# Patient Record
Sex: FEMALE | Race: WHITE | URBAN - METROPOLITAN AREA
[De-identification: names, ages, dates, MRNs, and addresses within clinical notes are randomized per-mention and may not be internally consistent; named-entity substitution may affect disease eponyms.]

---

## 2017-03-22 ENCOUNTER — THERAPY VISIT (OUTPATIENT)
Dept: PHYSICAL THERAPY | Facility: CLINIC | Age: 67
End: 2017-03-22
Payer: COMMERCIAL

## 2017-03-22 DIAGNOSIS — M79.671 RIGHT FOOT PAIN: ICD-10-CM

## 2017-03-22 DIAGNOSIS — Z47.89 AFTERCARE FOLLOWING SURGERY OF THE MUSCULOSKELETAL SYSTEM: ICD-10-CM

## 2017-03-22 PROCEDURE — 97164 PT RE-EVAL EST PLAN CARE: CPT | Mod: GP | Performed by: PHYSICAL THERAPIST

## 2017-03-22 PROCEDURE — 97110 THERAPEUTIC EXERCISES: CPT | Mod: GP | Performed by: PHYSICAL THERAPIST

## 2017-03-22 PROCEDURE — 97140 MANUAL THERAPY 1/> REGIONS: CPT | Mod: GP | Performed by: PHYSICAL THERAPIST

## 2017-03-22 NOTE — MR AVS SNAPSHOT
After Visit Summary   3/22/2017    Jyoti Alvarez    MRN: [id number]           Patient Information     Date Of Birth          1950        Visit Information        Provider Department      3/22/2017 2:40 PM Aby George, PT [facility]        Today's Diagnoses     Right foot pain        Aftercare following surgery of the musculoskeletal system           Follow-ups after your visit        Your next 10 appointments already scheduled     Mar 31, 2017  2:10 PM CDT   LEONARDO Extremity with Aby George PT   [facility] ([facility])    [address]  [facility] [id number] [phone]            Apr 05, 2017  2:00 PM CDT   [facility] Extremity with Aby George PT   [facility] ([facility])    [address]  [facility] [id number] [phone]              Who to contact     If you have questions or need follow up information about today's clinic visit or your schedule please contact [facility] directly at [phone].  Normal or non-critical lab and imaging results will be communicated to you by MyChart, letter or phone within 4 business days after the clinic has received the results. If you do not hear from us within 7 days, please contact the clinic through [employer] or phone. If you have a critical or abnormal lab result, we will notify you by phone as soon as possible.  Submit refill requests through [employer] or call your pharmacy and they will forward the refill request to us. Please allow 3 business days for your refill to be completed.          Additional Information About Your Visit        MyChart Information     [employer] lets you send messages to your doctor, view your test results, renew your prescriptions, schedule appointments and more. To sign up, go to  [email or web address] . Click on \"Log in\" on the left side of the screen, which will take you to the Welcome page. Then click on \"Sign up Now\" on the right side of the page.     You will be asked to enter the access code listed below, as well as some personal information. Please follow the directions to create your username and password.     Your access code is: [email or web address]  Expires: 2017  3:34 PM     Your access code will  in 90 days. If you need help or a new code, please call your [place] clinic or [phone].        Care EveryWhere ID     This is your Care EveryWhere ID. This could be used by other organizations to access your [place] medical records  [id number]         Blood Pressure from Last 3 Encounters:   No data found for BP    Weight from Last 3 Encounters:   No data found for Wt              We Performed the Following     HC PT RE-EVAL     LEONARDO PROGRESS NOTES REPORT     MANUAL THER TECH,1+REGIONS,EA 15 MIN     THERAPEUTIC EXERCISES        Primary Care Provider    None Specified       No primary provider on file.        Thank you!     Thank you for choosing [facility]  for your care. Our goal is always to provide you with excellent care. Hearing back from our patients is one way we can continue to improve our services. Please take a few minutes to complete the written survey that you may receive in the mail after your visit with us. Thank you!             Your Updated Medication List - Protect others around you: Learn how to safely use, store and throw away your medicines at www.disposemymeds.org.      Notice  As of 3/22/2017  3:34 PM    You have not been prescribed any medications.      "

## 2017-03-22 NOTE — PROGRESS NOTES
Subjective:    HPI                    Objective:    System    Physical Exam    General     ROS    Assessment/Plan:      PROGRESS  REPORT    Progress reporting period is from 12/28/16 to 3/22/17.       SUBJECTIVE  Subjective: Mary has returned from spending winter months in [place], she reports that she continues to have mid-foot pain with walking, and with active motion (not even WB).  Occasional swelling.  Feels that stiffness in big toe leading to midfoot pain.  L foot surgery revision for hammer toe on Monday 3/27, will limit WB activities for a couple of weeks.      Current Pain level: 3/10.     Initial Pain level: 2/10.   Changes in function:  Yes (See Goal flowsheet attached for changes in current functional level)  Adverse reaction to treatment or activity: activity - increased pain with walking    OBJECTIVE  Objective: R ankle AROM: DF 12, PF 53, Inv 12, Ev 6. Great toe flexion 12, great toe ext 30.  Ankle strength 5/5 in all directions, great toe 5/5 flexion, 4-/5 great toe ext.  No edema noted today.  Significant decrease in 1st MTP mobility into both flexion and extension.  Gait without significant deviation.  Addition of repeated great toe flexion/extension with instructions for increased frequency, every 2-3 hours, 2x10 reps each direction.       ASSESSMENT/PLAN  Updated problem list and treatment plan: Diagnosis 1:  R foot pain, R great toe pain, s/p R midfoot fusion, STJ fusion, posterior tibial tendon repair with midfoot fusion, cheilectomy, 2nd and 3rd metatarsal osteotomies    Pain -  hot/cold therapy, manual therapy, self management, education, directional preference exercise and home program  Decreased ROM/flexibility - manual therapy, therapeutic exercise and home program  Decreased joint mobility - manual therapy, therapeutic exercise and home program  Decreased strength - therapeutic exercise, therapeutic activities and home program  Impaired muscle performance - neuro re-education and home  program  Decreased function - therapeutic activities and home program  STG/LTGs have been met or progress has been made towards goals:  Yes (See Goal flow sheet completed today.)  Assessment of Progress: The patient's progress has plateaued.  Self Management Plans:  Patient has been instructed in a home treatment program.  Patient  has been instructed in self management of symptoms.  I have re-evaluated this patient and find that the nature, scope, duration and intensity of the therapy is appropriate for the medical condition of the patient.  Jyoti continues to require the following intervention to meet STG and LTG's:  PT    Recommendations:  This patient would benefit from continued therapy.     Frequency:  1-2 X week, once daily  Duration:  for up to 8 visits        Please refer to the daily flowsheet for treatment today, total treatment time and time spent performing 1:1 timed codes.

## 2017-04-05 ENCOUNTER — THERAPY VISIT (OUTPATIENT)
Dept: PHYSICAL THERAPY | Facility: CLINIC | Age: 67
End: 2017-04-05
Payer: COMMERCIAL

## 2017-04-05 DIAGNOSIS — M79.671 RIGHT FOOT PAIN: ICD-10-CM

## 2017-04-05 DIAGNOSIS — Z47.89 AFTERCARE FOLLOWING SURGERY OF THE MUSCULOSKELETAL SYSTEM: ICD-10-CM

## 2017-04-05 PROCEDURE — 97110 THERAPEUTIC EXERCISES: CPT | Mod: GP | Performed by: PHYSICAL THERAPIST

## 2017-04-05 PROCEDURE — 97140 MANUAL THERAPY 1/> REGIONS: CPT | Mod: GP | Performed by: PHYSICAL THERAPIST

## 2017-05-10 ENCOUNTER — THERAPY VISIT (OUTPATIENT)
Dept: PHYSICAL THERAPY | Facility: CLINIC | Age: 67
End: 2017-05-10
Payer: COMMERCIAL

## 2017-05-10 DIAGNOSIS — M79.671 RIGHT FOOT PAIN: ICD-10-CM

## 2017-05-10 DIAGNOSIS — Z47.89 AFTERCARE FOLLOWING SURGERY OF THE MUSCULOSKELETAL SYSTEM: ICD-10-CM

## 2017-05-10 PROCEDURE — 97140 MANUAL THERAPY 1/> REGIONS: CPT | Mod: GP | Performed by: PHYSICAL THERAPIST

## 2017-05-10 PROCEDURE — 97110 THERAPEUTIC EXERCISES: CPT | Mod: GP | Performed by: PHYSICAL THERAPIST

## 2017-05-12 ENCOUNTER — THERAPY VISIT (OUTPATIENT)
Dept: PHYSICAL THERAPY | Facility: CLINIC | Age: 67
End: 2017-05-12
Payer: COMMERCIAL

## 2017-05-12 DIAGNOSIS — Z47.89 AFTERCARE FOLLOWING SURGERY OF THE MUSCULOSKELETAL SYSTEM: ICD-10-CM

## 2017-05-12 DIAGNOSIS — M79.672 LEFT FOOT PAIN: Primary | ICD-10-CM

## 2017-05-12 PROCEDURE — 97110 THERAPEUTIC EXERCISES: CPT | Mod: GP | Performed by: PHYSICAL THERAPIST

## 2017-05-12 PROCEDURE — 97161 PT EVAL LOW COMPLEX 20 MIN: CPT | Mod: GP | Performed by: PHYSICAL THERAPIST

## 2017-05-12 PROCEDURE — 97140 MANUAL THERAPY 1/> REGIONS: CPT | Mod: GP | Performed by: PHYSICAL THERAPIST

## 2017-05-12 NOTE — PROGRESS NOTES
[facility] Initial Evaluation    Subjective:    Patient is a 67 year old female presenting with rehab left ankle/foot hpi. The history is provided by the patient. No  was used.   Jyoti Alvarez is a 67 year old female with a left ankle condition.  Condition occurred with:  Other reason.  Condition occurred: for unknown reasons.  This is a new condition  Mary underwent surgery by Dr. Abbott on her L foot on 10/10/17 for midfoot fusion, cheilectomy, neuroma excision from 3rd web space, tailor's bunionectomy, 2 MTPJ capsulotomy, 2nd metatarsal osteotomy, 2nd MPJ capsulotomy and tenotomy.  She underwent a 2nd surgery for hammer toe revision on 3/27/17.  Mary has been referred to PT for evaluation and treatment including ROM and strengthening to L foot.  Chief complaints: Pain in 3rd and 4th toes with walking, limited walking distance/time, unable to participate in previous recreational activities, pilates, walking differently overall.   .    Patient reports pain:  Toes.  Radiates to:  Foot.  Pain is described as stabbing, sharp and burning (feels like a nerve pain) and is intermittent and reported as 4/10.  Associated symptoms:  Loss of strength, loss of motion/stiffness and edema.   Symptoms are exacerbated by activity, walking, ascending stairs, descending stairs, weight bearing and certain positions (scrunching toes) and relieved by rest and ice.  Since onset symptoms are unchanged.  Special tests:  X-ray (well-healed at x-ray at 6 months post op).  Previous treatment includes surgery.    General health as reported by patient is excellent.  Pertinent medical history includes:  Osteoarthritis.  Medical allergies: yes (sulfa, ampicillin).  Other surgeries include:  Orthopedic surgery (R low back, L TKA, R midfoot fusion).  Current medications:  Anti-inflammatory (tramadol prn).  Current occupation is retired.    Primary job tasks include:  Driving and lifting  (pushing/pulling, care for grandchildren, housework).    Barriers include:  None as reported by patient.    Red flags:  None as reported by patient.                        Objective:    Standing Alignment:                Ankle/Foot:  Pes planus R and pes planus L    Gait:    Assistive Devices:  None  Deviations:  Lumbar:  Trunk lean LAnkle:  Normal          Ankle/Foot Evaluation  ROM:    AROM:    Dorsiflexion: Left:   8  Right:    Plantarflexion: Left:  62    Right:   Inversion: Left:  32     Right:   Eversion: 16     Right:   Great toe flexion: Left:  5     Right:   Great Toe Extension: Left:  45     Right:    Strength:    Dorsiflexion:  Left: 5/5   Strong/pain free    Pain:     Plantarflexion: Left: 5/5   Strong/pain free  Pain:     Inversion:Left: 5/5  Strong/pain free  Pain:     Right: /5 strong/pain free Pain:  Eversion:Left: 5/5  Strong/pain free  Pain:    Flexion Great Toe:Left: 5/5  Strong/pain free  Pain:    Extension Great Toe:Left: 5/5  Strong/pain free  Pain:          Extensor Digitorum: Left: 5/5  Strong/pain free  Pain:      LIGAMENT TESTING: not assessed                PALPATION: Palpation of ankle: 3rd webspace tenderness.    EDEMA: Edema ankle: minimal edema on 2nd toe only, not generalized to foot/forefoot.          MOBILITY TESTING:       Talocrural Left: hypomobile        Midtarsal Left: hypomobile      First Ray Left: hypomobile      FUNCTIONAL TESTS: not assessed                                                              General     ROS    Assessment/Plan:      Patient is a 67 year old female with L foot complaints.    Patient has the following significant findings with corresponding treatment plan.                Diagnosis 1:  L foot pain s/p midfoot fusion, cheilectomy, neuroma excision from 3rd web space, tailor's bunionectomy, 2 MTPJ capsulotomy, 2nd metatarsal osteotomy, 2nd MPJ capsulotomy and tenotomy Pain -  hot/cold therapy, manual therapy, self management, education, directional  preference exercise and home program  Decreased ROM/flexibility - manual therapy, therapeutic exercise and home program  Decreased joint mobility - manual therapy, therapeutic exercise and home program  Inflammation - cold therapy and self management/home program  Edema - cold therapy and self management/home program  Decreased function - therapeutic activities and home program    Therapy Evaluation Codes:   1) History comprised of:   Personal factors that impact the plan of care:      None.    Comorbidity factors that impact the plan of care are:      None.     Medications impacting care: None.  2) Examination of Body Systems comprised of:   Body structures and functions that impact the plan of care:      L foot.   Activity limitations that impact the plan of care are:      Stairs, Standing and Walking.  3) Clinical presentation characteristics are:   Stable/Uncomplicated.  4) Decision-Making    Low complexity using standardized patient assessment instrument and/or measureable assessment of functional outcome.  Cumulative Therapy Evaluation is: Low complexity.    Previous and current functional limitations:  (See Goal Flow Sheet for this information)    Short term and Long term goals: (See Goal Flow Sheet for this information)     Communication ability:  Patient appears to be able to clearly communicate and understand verbal and written communication and follow directions correctly.  Treatment Explanation - The following has been discussed with the patient:   RX ordered/plan of care  Anticipated outcomes  Possible risks and side effects  This patient would benefit from PT intervention to resume normal activities.   Rehab potential is good.    Frequency:  1-2 X week, once daily  Duration:  for 8 visits  Discharge Plan:  Achieve all LTG.  Independent in home treatment program.  Reach maximal therapeutic benefit.    Please refer to the daily flowsheet for treatment today, total treatment time and time spent performing  1:1 timed codes.

## 2017-05-12 NOTE — MR AVS SNAPSHOT
After Visit Summary   5/12/2017    Jyoti Alvarez    MRN: [id number]           Patient Information     Date Of Birth          1950        Visit Information        Provider Department      5/12/2017 2:10 PM Aby George, PT [facility]        Today's Diagnoses     Left foot pain    -  1    Aftercare following surgery of the musculoskeletal system           Follow-ups after your visit        Your next 10 appointments already scheduled     May 19, 2017  2:50 PM CDT   LEONARDO Extremity with Aby George PT   [facility] ([facility])    [address]  [facility] [address]   [phone]            May 24, 2017  2:40 PM CDT   LEONARDO Extremity with Aby George PT   [facility] ([facility])    [address]  [facility] [address]   [phone]            May 31, 2017  2:40 PM CDT   LEONARDO Extremity with Aby George, PT   [facility] ([facility])    [address]  [facility] [address]   [phone]              Who to contact     If you have questions or need follow up information about today's clinic visit or your schedule please contact [facility] THER[facility] directly at [phone].  Normal or non-critical lab and imaging results will be communicated to you by MyChart, letter or phone within 4 business days after the clinic has received the results. If you do not hear from us within 7 days, please contact the clinic through MyChart or phone. If you have a critical or abnormal lab result, we will notify you by phone as soon as possible.  Submit refill requests through [employer] or call your pharmacy and they will forward the refill request to us. Please allow 3 business days for your refill to be completed.  "         Additional Information About Your Visit        MyChart Information     byUs lets you send messages to your doctor, view your test results, renew your prescriptions, schedule appointments and more. To sign up, go to [email or web address] . Click on \"Log in\" on the left side of the screen, which will take you to the Welcome page. Then click on \"Sign up Now\" on the right side of the page.     You will be asked to enter the access code listed below, as well as some personal information. Please follow the directions to create your username and password.     Your access code is: [email or web address]  Expires: 2017  3:34 PM     Your access code will  in 90 days. If you need help or a new code, please call your [place] clinic or [phone].        Care EveryWhere ID     This is your Care EveryWhere ID. This could be used by other organizations to access your [place] medical records  [id number]         Blood Pressure from Last 3 Encounters:   No data found for BP    Weight from Last 3 Encounters:   No data found for Wt              We Performed the Following     HC PT EVAL, LOW COMPLEXITY     LEONARDO INITIAL EVAL REPORT     MANUAL THER TECH,1+REGIONS,EA 15 MIN     THERAPEUTIC EXERCISES        Primary Care Provider    None Specified       No primary provider on file.        Thank you!     Thank you for choosing [facility] THERAP[facility]  for your care. Our goal is always to provide you with excellent care. Hearing back from our patients is one way we can continue to improve our services. Please take a few minutes to complete the written survey that you may receive in the mail after your visit with us. Thank you!             Your Updated Medication List - Protect others around you: Learn how to safely use, store and throw away your medicines at www.disposemymeds.org.      Notice  As of 2017  3:46 PM    You have not been prescribed any medications.      "

## 2017-05-19 ENCOUNTER — THERAPY VISIT (OUTPATIENT)
Dept: PHYSICAL THERAPY | Facility: CLINIC | Age: 67
End: 2017-05-19
Payer: COMMERCIAL

## 2017-05-19 DIAGNOSIS — M79.672 LEFT FOOT PAIN: ICD-10-CM

## 2017-05-19 DIAGNOSIS — Z47.89 AFTERCARE FOLLOWING SURGERY OF THE MUSCULOSKELETAL SYSTEM: ICD-10-CM

## 2017-05-19 PROCEDURE — 97140 MANUAL THERAPY 1/> REGIONS: CPT | Mod: GP | Performed by: PHYSICAL THERAPIST

## 2017-05-19 PROCEDURE — 97110 THERAPEUTIC EXERCISES: CPT | Mod: GP | Performed by: PHYSICAL THERAPIST

## 2017-05-24 ENCOUNTER — THERAPY VISIT (OUTPATIENT)
Dept: PHYSICAL THERAPY | Facility: CLINIC | Age: 67
End: 2017-05-24
Payer: COMMERCIAL

## 2017-05-24 DIAGNOSIS — M79.672 LEFT FOOT PAIN: ICD-10-CM

## 2017-05-24 DIAGNOSIS — Z47.89 AFTERCARE FOLLOWING SURGERY OF THE MUSCULOSKELETAL SYSTEM: ICD-10-CM

## 2017-05-24 PROCEDURE — 97110 THERAPEUTIC EXERCISES: CPT | Mod: GP | Performed by: PHYSICAL THERAPIST

## 2017-05-24 PROCEDURE — 97140 MANUAL THERAPY 1/> REGIONS: CPT | Mod: GP | Performed by: PHYSICAL THERAPIST

## 2017-05-31 ENCOUNTER — THERAPY VISIT (OUTPATIENT)
Dept: PHYSICAL THERAPY | Facility: CLINIC | Age: 67
End: 2017-05-31
Payer: COMMERCIAL

## 2017-05-31 DIAGNOSIS — M79.672 LEFT FOOT PAIN: ICD-10-CM

## 2017-05-31 DIAGNOSIS — Z47.89 AFTERCARE FOLLOWING SURGERY OF THE MUSCULOSKELETAL SYSTEM: ICD-10-CM

## 2017-05-31 PROCEDURE — 97110 THERAPEUTIC EXERCISES: CPT | Mod: GP | Performed by: PHYSICAL THERAPIST

## 2017-05-31 PROCEDURE — 97112 NEUROMUSCULAR REEDUCATION: CPT | Mod: GP | Performed by: PHYSICAL THERAPIST

## 2017-05-31 PROCEDURE — 97140 MANUAL THERAPY 1/> REGIONS: CPT | Mod: GP | Performed by: PHYSICAL THERAPIST

## 2017-06-09 ENCOUNTER — THERAPY VISIT (OUTPATIENT)
Dept: PHYSICAL THERAPY | Facility: CLINIC | Age: 67
End: 2017-06-09
Payer: COMMERCIAL

## 2017-06-09 DIAGNOSIS — Z47.89 AFTERCARE FOLLOWING SURGERY OF THE MUSCULOSKELETAL SYSTEM: ICD-10-CM

## 2017-06-09 DIAGNOSIS — M79.672 LEFT FOOT PAIN: ICD-10-CM

## 2017-06-09 PROCEDURE — 97110 THERAPEUTIC EXERCISES: CPT | Mod: GP | Performed by: PHYSICAL THERAPIST

## 2017-06-09 PROCEDURE — 97140 MANUAL THERAPY 1/> REGIONS: CPT | Mod: GP | Performed by: PHYSICAL THERAPIST

## 2017-06-09 NOTE — MR AVS SNAPSHOT
"              After Visit Summary   6/9/2017    Jyoti Alvarez    MRN: [id number]           Patient Information     Date Of Birth          1950        Visit Information        Provider Department      6/9/2017 11:00 AM Aby George, PT [facility]        Today's Diagnoses     Left foot pain        Aftercare following surgery of the musculoskeletal system           Follow-ups after your visit        Your next 10 appointments already scheduled     Jun 30, 2017 12:50 PM CDT   LEONARDO Extremity with Aby George PT   [facility] ([facility])    [address]  [facility] [address] [phone]              Who to contact     If you have questions or need follow up information about today's clinic visit or your schedule please contact [facility] directly at [phone].  Normal or non-critical lab and imaging results will be communicated to you by [employer], letter or phone within 4 business days after the clinic has received the results. If you do not hear from us within 7 days, please contact the clinic through [employer] or phone. If you have a critical or abnormal lab result, we will notify you by phone as soon as possible.  Submit refill requests through [employer] or call your pharmacy and they will forward the refill request to us. Please allow 3 business days for your refill to be completed.          Additional Information About Your Visit        MyChart Information     [employer] lets you send messages to your doctor, view your test results, renew your prescriptions, schedule appointments and more. To sign up, go to [email or web address] . Click on \"Log in\" on the left side of the screen, which will take you to the Welcome page. Then click on \"Sign up Now\" on the right side of the page.     You will be asked to enter the access code listed " below, as well as some personal information. Please follow the directions to create your username and password.     Your access code is: [email or web address]  Expires: 2017  3:34 PM     Your access code will  in 90 days. If you need help or a new code, please call your [place] clinic or [phone].        Care EveryWhere ID     This is your Care EveryWhere ID. This could be used by other organizations to access your [place] medical records  [id number]         Blood Pressure from Last 3 Encounters:   No data found for BP    Weight from Last 3 Encounters:   No data found for Wt              We Performed the Following     MANUAL THER TECH,1+REGIONS,EA 15 MIN     THERAPEUTIC EXERCISES        Primary Care Provider    None Specified       No primary provider on file.        Thank you!     Thank you for choosing [facility] TH[facility]  for your care. Our goal is always to provide you with excellent care. Hearing back from our patients is one way we can continue to improve our services. Please take a few minutes to complete the written survey that you may receive in the mail after your visit with us. Thank you!             Your Updated Medication List - Protect others around you: Learn how to safely use, store and throw away your medicines at www.disposemymeds.org.      Notice  As of 2017 11:44 AM    You have not been prescribed any medications.

## 2017-12-04 PROBLEM — M79.672 LEFT FOOT PAIN: Status: RESOLVED | Noted: 2017-05-12 | Resolved: 2017-12-04

## 2017-12-04 NOTE — PROGRESS NOTES
Jyoti did not return to PT for follow up on R or L foot.  Current status is unknown.  Discharge at this time.

## 2018-05-01 ENCOUNTER — THERAPY VISIT (OUTPATIENT)
Dept: PHYSICAL THERAPY | Facility: CLINIC | Age: 68
End: 2018-05-01
Payer: COMMERCIAL

## 2018-05-01 DIAGNOSIS — M54.12 CERVICAL RADICULOPATHY: Primary | ICD-10-CM

## 2018-05-01 PROCEDURE — 97161 PT EVAL LOW COMPLEX 20 MIN: CPT | Mod: GP | Performed by: PHYSICAL THERAPIST

## 2018-05-01 PROCEDURE — 97110 THERAPEUTIC EXERCISES: CPT | Mod: GP | Performed by: PHYSICAL THERAPIST

## 2018-05-01 NOTE — PROGRESS NOTES
[facility] Initial Evaluation  Subjective:  Patient is a 68 year old female presenting with rehab cervical spine hpi. The history is provided by the patient. No  was used.   Jyoti Alvarez is a 68 year old female with a cervical spine condition.  Condition occurred with:  Insidious onset.  Condition occurred: for unknown reasons.  This is a chronic condition  Jyoti reports that her L shoulder pain has been going on for about 3 years with radiation up to neck and head for past 1 year.  She was Dr. Valverde on 4/26/18 with c/o neck/L shoulder, L hip and LBP.  She was referred to PT for evaluation and treatment. Evaluating neck/shoulder pain today. Chief complaints: L shoulder and neck pain with reaching, lifting, carrying, difficulty finding comfort for sleep and loss of sleep, pain with turning head when driving.  .    Patient reports pain:  Cervical left side, upper cervical spine, mid cervical spine and lower cervical spine.  Radiates to:  Shoulder left, upper arm left, elbow left and hand left.  Pain is described as sharp and aching and is constant Pain Scale: 2-8/10 (ranges)  Associated symptoms:  Loss of motion/stiffness, loss of strength, headache, tingling and numbness. Pain is worse during the night (activity/position dependent).  Symptoms are exacerbated by rotating head, carrying, lifting, driving, change of position, certain positions, sitting, looking up or down and lying down and relieved by activity/movement (biofreeze).  Since onset symptoms are gradually worsening.  Special tests:  MRI (shoulder MRI- clear for RC pathology; postive for OA).  Previous treatment includes physical therapy.  There was moderate improvement following previous treatment.  General health as reported by patient is good.  Pertinent medical history includes:  Osteoarthritis and depression.  Medical allergies: yes (penicillins, sulfa).  Other surgeries include:  Other (back, knee, B feet,  L5/S1 fusion in 2004).  Current medications:  Pain medication, anti-depressants and muscle relaxants.  Current occupation is retired.    Primary job tasks include:  Repetitive tasks (driving, lifting/carrying).    Barriers include:  None as reported by the patient.    Red flags:  None as reported by the patient.                        Objective:  Standing Alignment:    Cervical/Thoracic:  Forward head and thoracic kyphosis increased  Shoulder/UE:  Rounded shoulders                                       Shoulder Evaluation:  ROM:  AROM:  : B shoulder AROM: Full with mild pain at end range.  Tightness in neck with shoulder ROM.                                    Strength:    Flexion: Left:5/5   Pain:    Right: 5/5     Pain:   Extension:  Left: 5/5    Pain:    Right: 5/5    Pain:  Abduction:  Left: 5/5  Pain:    Right: 5/5     Pain:      External Rotation:   Left:5/5     Pain:   Right:5/5     Pain:          Elbow Extension:  Left:4-/5     Pain:    Right:5/5     Pain:                                           Jose Cervical Evaluation    Posture:  Sitting: fair  Standing: fair  Protruding Head: yes  Wry Neck: no  Correction of Posture: better    Movement Loss:  Protrusion (PRO): nil  Flexion (Flex): nil  Retraction (RET): min  Extension (EXT): mod and pain  Lateral Flexion Right (LF R): mod  Lateral Flexion Left (LF L): min and pain  Rotation Right (ROT R): mod and pain  Rotation Left (ROT L): mod and pain  Test Movements:      RET: During: increases  After: no worse  Mechanical Response: no effect  Repeat RET: During: decreases and centralizing  After: better  Mechanical Response: IncROM  RET EXT: During: increases  After: no worse  Mechanical Response: no effect  Repeat RET EXT: During: increases and peripheralizing  After: worse                          Conclusion: derangement  Principle of Treatment:  Posture Correction: neutral spine in sitting, scap ret/dep frequently throughout the day    Extension: repeated  retraction with self OP in sitting x10-20 every 2-3 hours or as sx dictate    Other: pt education reagrding centralization vs peripheralization and differential dx between shoulder and neck sx                                       L elbow ext strength improved to 4+/5 following repeated cervical movements.    ROS    Assessment/Plan:    Patient is a 68 year old female with cervical and left side shoulder complaints.    Patient has the following significant findings with corresponding treatment plan.                Diagnosis 1:  Neck and L arm pain  Pain -  hot/cold therapy, manual therapy, self management, education, directional preference exercise and home program  Decreased ROM/flexibility - manual therapy, therapeutic exercise and home program  Decreased joint mobility - manual therapy, therapeutic exercise and home program  Decreased strength - therapeutic exercise, therapeutic activities and home program  Impaired muscle performance - neuro re-education and home program  Decreased function - therapeutic activities and home program  Impaired posture - neuro re-education and home program    Therapy Evaluation Codes:   1) History comprised of:   Personal factors that impact the plan of care:      None.    Comorbidity factors that impact the plan of care are:      None.     Medications impacting care: None.  2) Examination of Body Systems comprised of:   Body structures and functions that impact the plan of care:      Cervical spine and Shoulder.   Activity limitations that impact the plan of care are:      Bathing, Driving, Dressing, Grasping, Lifting, Reading/Computer work, Sleeping and Laying down.  3) Clinical presentation characteristics are:   Stable/Uncomplicated.  4) Decision-Making    Low complexity using standardized patient assessment instrument and/or measureable assessment of functional outcome.  Cumulative Therapy Evaluation is: Low complexity.    Previous and current functional limitations:  (See Goal  Flow Sheet for this information)    Short term and Long term goals: (See Goal Flow Sheet for this information)     Communication ability:  Patient appears to be able to clearly communicate and understand verbal and written communication and follow directions correctly.  Treatment Explanation - The following has been discussed with the patient:   RX ordered/plan of care  Anticipated outcomes  Possible risks and side effects  This patient would benefit from PT intervention to resume normal activities.   Rehab potential is excellent.    Frequency:  1 X week, once daily  Duration:  for 8 weeks  Discharge Plan:  Achieve all LTG.  Independent in home treatment program.  Reach maximal therapeutic benefit.    Please refer to the daily flowsheet for treatment today, total treatment time and time spent performing 1:1 timed codes.

## 2018-05-01 NOTE — LETTER
[facility] ATHLETIC [facility] PHYSICAL THERAPY  [address]  [place] [id number] [phone]    May 2, 2018    Re: Jyoti Alvarez   :   1950  MRN:  [id number]   REFERRING PHYSICIAN:   Adina Valverde    [facility]    Date of Initial Evaluation:  2018  Visits:  Rxs Used: 1  Reason for Referral:  Cervical radiculopathy    EVALUATION SUMMARY    [facility]  Subjective:  Patient is a 68 year old female presenting with rehab cervical spine hpi. The history is provided by the patient. No  was used.   Jyoti Alvarez is a 68 year old female with a cervical spine condition.  Condition occurred with:  Insidious onset.  Condition occurred: for unknown reasons.  This is a chronic condition  Jyoti reports that her L shoulder pain has been going on for about 3 years with radiation up to neck and head for past 1 year.  She was Dr. Valverde on 18 with c/o neck/L shoulder, L hip and LBP.  She was referred to PT for evaluation and treatment. Evaluating neck/shoulder pain today. Chief complaints: L shoulder and neck pain with reaching, lifting, carrying, difficulty finding comfort for sleep and loss of sleep, pain with turning head when driving.  .    Patient reports pain:  Cervical left side, upper cervical spine, mid cervical spine and lower cervical spine.  Radiates to:  Shoulder left, upper arm left, elbow left and hand left.  Pain is described as sharp and aching and is constant Pain Scale: 2-8/10 (ranges)  Associated symptoms:  Loss of motion/stiffness, loss of strength, headache, tingling and numbness. Pain is worse during the night (activity/position dependent).  Symptoms are exacerbated by rotating head, carrying, lifting, driving, change of position, certain positions, sitting, looking up or down and lying down and relieved by activity/movement (biofreeze).   Since onset symptoms are gradually worsening.  Special tests:  MRI (shoulder MRI- clear for RC pathology; postive for OA).  Previous treatment includes physical therapy.  There was moderate improvement following previous treatment.  General health as reported by patient is good.  Pertinent medical history includes:  Osteoarthritis and depression.  Medical allergies: yes (penicillins, sulfa).  Other surgeries include:  Other (back, knee, B feet, L5/S1 fusion in 2004).  Current medications:  Pain medication, anti-depressants and muscle relaxants.  Current occupation is retired.    Primary job tasks include:  Repetitive tasks (driving, lifting/carrying).  Barriers include:  None as reported by the patient.  Red flags:  None as reported by the patient.                        Objective:  Standing Alignment:    Cervical/Thoracic:  Forward head and thoracic kyphosis increased  Shoulder/UE:  Rounded shoulders  Shoulder Evaluation:  ROM:  AROM:  : B shoulder AROM: Full with mild pain at end range.  Tightness in neck with shoulder ROM.    Strength:    Flexion: Left:5/5   Pain:    Right: 5/5     Pain:   Extension:  Left: 5/5    Pain:    Right: 5/5    Pain:  Abduction:  Left: 5/5  Pain:    Right: 5/5     Pain:  External Rotation:   Left:5/5     Pain:   Right:5/5     Pain:    Elbow Extension:  Left:4-/5     Pain:    Right:5/5     Pain:  Jose Cervical Evaluation  Posture:  Sitting: fair  Standing: fair  Protruding Head: yes  Wry Neck: no  Correction of Posture: better  Movement Loss:  Protrusion (PRO): nil  Flexion (Flex): nil  Retraction (RET): min  Extension (EXT): mod and pain  Lateral Flexion Right (LF R): mod  Lateral Flexion Left (LF L): min and pain  Rotation Right (ROT R): mod and pain  Rotation Left (ROT L): mod and pain  Test Movements:  RET: During: increases  After: no worse  Mechanical Response: no effect  Repeat RET: During: decreases and centralizing  After: better  Mechanical Response: IncROM  RET EXT: During:  increases  After: no worse  Mechanical Response: no effect  Repeat RET EXT: During: increases and peripheralizing  After: worse    Conclusion: derangement  Principle of Treatment:  Posture Correction: neutral spine in sitting, scap ret/dep frequently throughout the day  Extension: repeated retraction with self OP in sitting x10-20 every 2-3 hours or as sx dictate  Other: pt education reagrding centralization vs peripheralization and differential dx between shoulder and neck sx                                    L elbow ext strength improved to 4+/5 following repeated cervical movements.    Assessment/Plan:    Patient is a 68 year old female with cervical and left side shoulder complaints.    Patient has the following significant findings with corresponding treatment plan.                Diagnosis 1:  Neck and L arm pain  Pain -  hot/cold therapy, manual therapy, self management, education, directional preference exercise and home program  Decreased ROM/flexibility - manual therapy, therapeutic exercise and home program  Decreased joint mobility - manual therapy, therapeutic exercise and home program  Decreased strength - therapeutic exercise, therapeutic activities and home program  Impaired muscle performance - neuro re-education and home program  Decreased function - therapeutic activities and home program  Impaired posture - neuro re-education and home program    Therapy Evaluation Codes:   1) History comprised of:   Personal factors that impact the plan of care:      None.    Comorbidity factors that impact the plan of care are:      None.     Medications impacting care: None.  2) Examination of Body Systems comprised of:   Body structures and functions that impact the plan of care:      Cervical spine and Shoulder.   Activity limitations that impact the plan of care are:      Bathing, Driving, Dressing, Grasping, Lifting, Reading/Computer work, Sleeping and Laying down.  3) Clinical presentation characteristics  are:   Stable/Uncomplicated.  4) Decision-Making    Low complexity using standardized patient assessment instrument and/or measureable assessment of functional outcome.  Cumulative Therapy Evaluation is: Low complexity.    Previous and current functional limitations:  (See Goal Flow Sheet for this information)    Short term and Long term goals: (See Goal Flow Sheet for this information)     Communication ability:  Patient appears to be able to clearly communicate and understand verbal and written communication and follow directions correctly.  Treatment Explanation - The following has been discussed with the patient:   RX ordered/plan of care  Anticipated outcomes  Possible risks and side effects  This patient would benefit from PT intervention to resume normal activities.   Rehab potential is excellent.    Frequency:  1 X week, once daily  Duration:  for 8 weeks  Discharge Plan:  Achieve all LTG.  Independent in home treatment program.  Reach maximal therapeutic benefit.    Thank you for your referral.    INQUIRIES  Therapist: Selam Eckert, PT  [facility] PHYSICAL THERAPY  [address] [place] [address]. [address]  [facility] [address]  Phone: [phone]  Fax: [phone]

## 2018-05-08 ENCOUNTER — THERAPY VISIT (OUTPATIENT)
Dept: PHYSICAL THERAPY | Facility: CLINIC | Age: 68
End: 2018-05-08
Payer: COMMERCIAL

## 2018-05-08 DIAGNOSIS — M54.12 CERVICAL RADICULOPATHY: ICD-10-CM

## 2018-05-08 PROCEDURE — 97110 THERAPEUTIC EXERCISES: CPT | Mod: GP | Performed by: PHYSICAL THERAPIST

## 2018-05-08 PROCEDURE — 97140 MANUAL THERAPY 1/> REGIONS: CPT | Mod: GP | Performed by: PHYSICAL THERAPIST

## 2018-05-15 ENCOUNTER — THERAPY VISIT (OUTPATIENT)
Dept: PHYSICAL THERAPY | Facility: CLINIC | Age: 68
End: 2018-05-15
Payer: COMMERCIAL

## 2018-05-15 DIAGNOSIS — M25.552 HIP PAIN, LEFT: ICD-10-CM

## 2018-05-15 DIAGNOSIS — G89.29 CHRONIC LEFT-SIDED LOW BACK PAIN WITH LEFT-SIDED SCIATICA: Primary | ICD-10-CM

## 2018-05-15 DIAGNOSIS — M54.42 CHRONIC LEFT-SIDED LOW BACK PAIN WITH LEFT-SIDED SCIATICA: Primary | ICD-10-CM

## 2018-05-15 PROCEDURE — 97110 THERAPEUTIC EXERCISES: CPT | Mod: GP | Performed by: PHYSICAL THERAPIST

## 2018-05-15 PROCEDURE — 97161 PT EVAL LOW COMPLEX 20 MIN: CPT | Mod: GP | Performed by: PHYSICAL THERAPIST

## 2018-05-15 ASSESSMENT — ACTIVITIES OF DAILY LIVING (ADL)
WALKING_15_MINUTES_OR_GREATER: SLIGHT DIFFICULTY
WALKING_DOWN_STEEP_HILLS: MODERATE DIFFICULTY
SITTING_FOR_15_MINUTES: SLIGHT DIFFICULTY
LIGHT_TO_MODERATE_WORK: SLIGHT DIFFICULTY
HOS_ADL_HIGHEST_POTENTIAL_SCORE: 68
PUTTING_ON_SOCKS_AND_SHOES: NO DIFFICULTY AT ALL
GETTING_INTO_AND_OUT_OF_A_BATHTUB: SLIGHT DIFFICULTY
WALKING_APPROXIMATELY_10_MINUTES: NO DIFFICULTY AT ALL
HOW_WOULD_YOU_RATE_YOUR_CURRENT_LEVEL_OF_FUNCTION_DURING_YOUR_USUAL_ACTIVITIES_OF_DAILY_LIVING_FROM_0_TO_100_WITH_100_BEING_YOUR_LEVEL_OF_FUNCTION_PRIOR_TO_YOUR_HIP_PROBLEM_AND_0_BEING_THE_INABILITY_TO_PERFORM_ANY_OF_YOUR_USUAL_DAILY_ACTIVITIES?: 100
HOS_ADL_COUNT: 17
DEEP_SQUATTING: UNABLE TO DO
HOS_ADL_SCORE(%): 69.12
WALKING_INITIALLY: NO DIFFICULTY AT ALL
TWISTING/PIVOTING_ON_INVOLVED_LEG: MODERATE DIFFICULTY
RECREATIONAL_ACTIVITIES: SLIGHT DIFFICULTY
STEPPING_UP_AND_DOWN_CURBS: SLIGHT DIFFICULTY
WALKING_UP_STEEP_HILLS: MODERATE DIFFICULTY
HOS_ADL_ITEM_SCORE_TOTAL: 47
HEAVY_WORK: MODERATE DIFFICULTY
STANDING_FOR_15_MINUTES: SLIGHT DIFFICULTY
GOING_DOWN_1_FLIGHT_OF_STAIRS: SLIGHT DIFFICULTY
GETTING_INTO_AND_OUT_OF_AN_AVERAGE_CAR: SLIGHT DIFFICULTY
GOING_UP_1_FLIGHT_OF_STAIRS: SLIGHT DIFFICULTY
ROLLING_OVER_IN_BED: NO DIFFICULTY AT ALL

## 2018-05-15 NOTE — PROGRESS NOTES
"[facility] Initial Evaluation  Subjective:  Patient is a 68 year old female presenting with rehab left hip hpi. The history is provided by the patient. No  was used.   Jyoti Alvarez is a 68 year old female with a left hip condition.  Condition occurred with:  Insidious onset.  Condition occurred: for unknown reasons.  This is a chronic condition  Mary saw Dr. Valverde on 4/9/18 with c/o L hip pain.  She was diagnosed with trochanteric bursitis and was referred to PT for evaluation and treatment.  Mary reports that her L hip has been bothersome for about 3 years and was going on when she had B foot mid-foot fusions, but now feet are healed and she feels she still \"walks like a duck\" and pain continues in L hip.  Chief complaints: difficulty with stepping up (stairs, up steep hills) leg feels like it's not working right, pain with car transfer.  Pt reports intermittent low back pain, kayli with prolonged walking when waking up in morning.  .    Patient reports pain:  Lateral and greater trochanter (Left low back).  Radiates to:  Gluteals, hip and thigh.  Pain is described as sharp and aching and is intermittent and reported as 2/10.  Associated symptoms:  Buckling/giving out, loss of strength and tingling (tingling in L foot with sitting). Worse during: activity/position dependent.  Symptoms are exacerbated by ascending stairs, bending/squatting, descending stairs, walking, sitting, certain positions, lying on extremity, activity and transfers and relieved by rest, ice and NSAID's.  Since onset symptoms are unchanged.  Special tests:  X-ray (no bony pathology).      General health as reported by patient is good.  Pertinent medical history includes:  Osteoarthritis and depression.  Medical allergies: yes (penicillins, sulfa).  Other surgeries include:  Orthopedic surgery (back, knee, B feet, L5/S1 fusion).  Current medications:  Pain medication, muscle relaxants and " "anti-depressants.  Current occupation is retired.    Primary job tasks include:  Repetitive tasks and driving (homemaking tasks, lifting/carrying).    Barriers include:  None as reported by the patient.    Red flags:  None as reported by the patient.                        Objective:  Standing Alignment:          Pelvic:  Iliac crest high L          Gait:    Gait Type:  Antalgic   Assistive Devices:  None  Deviations:  Lumbar:  Trunk lean L                                                 Hip Evaluation  HIP AROM:  AROM:   Left Hip:     Normal (min loss hip ext, all other motions normal)    Right Hip:                      Hip Strength:    Flexion:   Left: 4+/5   Pain:                      Extension:  Left:  2+/5  -  Pain:weak/pain free  Abduction:  Left:  3/5    -   Pain:weak/pain free  Adduction:  Left: /5   Pain:weak/pain free                  Hip Palpation:  Palpations normal left hip: no TTP in lumbar spine.   Left hip tenderness present at:   Greater Trachanter  Left hip tenderness not present at:  PSIS or Iliac Crest    Functional Testing:  not assessed                         [place] Lumbar Evaluation    Posture:  Sitting: poor  Standing: poor  Lordosis: Reduced  Lateral Shift: yes and left  Correction of Posture: no effect  Other Observations: pt reports \"easier walking\" following repeated extension in lying  Movement Loss:  Flexion (Flex): min and pain  Extension (EXT): major  Side Glide R (SG R): min and pain  Side Mobile L (SG L): min and pain  Test Movements:        EIL: During: centralizing  After: no effect  Mechanical Response: no effect  Repeat EIL: During: decreases and centralizing  After: centralizing and better  Mechanical Response: IncROM        Conclusion: derangement (probably lumbar derangment likely in addition to trochanteric bursitis)                                         ROS    Assessment/Plan:    Patient is a 68 year old female with lumbar and left side hip complaints.    Patient has " the following significant findings with corresponding treatment plan.                Diagnosis 1:  L-sided LBP, L hip pain  Pain -  hot/cold therapy, manual therapy, self management, education, directional preference exercise and home program  Decreased ROM/flexibility - manual therapy, therapeutic exercise and home program  Decreased strength - therapeutic exercise, therapeutic activities and home program  Impaired gait - gait training and home program  Impaired muscle performance - neuro re-education and home program  Decreased function - therapeutic activities and home program  Impaired posture - neuro re-education and home program    Therapy Evaluation Codes:   1) History comprised of:   Personal factors that impact the plan of care:      None.    Comorbidity factors that impact the plan of care are:      None.     Medications impacting care: None.  2) Examination of Body Systems comprised of:   Body structures and functions that impact the plan of care:      Hip and Lumbar spine.   Activity limitations that impact the plan of care are:      Sitting, Squatting/kneeling, Stairs, Walking, Laying down and car transfers, standing after sitting.  3) Clinical presentation characteristics are:   Stable/Uncomplicated.  4) Decision-Making    Low complexity using standardized patient assessment instrument and/or measureable assessment of functional outcome.  Cumulative Therapy Evaluation is: Low complexity.    Previous and current functional limitations:  (See Goal Flow Sheet for this information)    Short term and Long term goals: (See Goal Flow Sheet for this information)     Communication ability:  Patient appears to be able to clearly communicate and understand verbal and written communication and follow directions correctly.  Treatment Explanation - The following has been discussed with the patient:   RX ordered/plan of care  Anticipated outcomes  Possible risks and side effects  This patient would benefit from PT  intervention to resume normal activities.   Rehab potential is excellent.    Frequency:  1 X week, once daily  Duration:  for 8 weeks  Discharge Plan:  Achieve all LTG.  Independent in home treatment program.  Reach maximal therapeutic benefit.    Please refer to the daily flowsheet for treatment today, total treatment time and time spent performing 1:1 timed codes.

## 2018-05-15 NOTE — MR AVS SNAPSHOT
After Visit Summary   5/15/2018    Jyoti Alvarez    MRN: [id number]           Patient Information     Date Of Birth          1950        Visit Information        Provider Department      5/15/2018 2:10 PM Aby George, PT [facility]        Today's Diagnoses     Chronic left-sided low back pain with left-sided sciatica    -  1    Hip pain, left           Follow-ups after your visit        Your next 10 appointments already scheduled     May 25, 2018  1:30 PM CDT   LEONARDO Extremity with Aby George, PT   [facility] ([facility])    [address]  [facility] [address]   [phone]            May 29, 2018  2:10 PM CDT   LEONARDO Extremity with Aby George PT   [facility] ([facility])    [address]  [facility] [address]   [phone]            Jun 05, 2018  2:10 PM CDT   LEONARDO Extremity with Aby George, PT   [facility] ([facility])    [address]  [facility] [address]   [phone]              Who to contact     If you have questions or need follow up information about today's clinic visit or your schedule please contact [facility] directly at [phone].  Normal or non-critical lab and imaging results will be communicated to you by MyChart, letter or phone within 4 business days after the clinic has received the results. If you do not hear from us within 7 days, please contact the clinic through MyChart or phone. If you have a critical or abnormal lab result, we will notify you by phone as soon as possible.  Submit refill requests through [email or web address] or call your pharmacy and they will forward the refill request to us. Please allow 3 business days for your refill to be completed.  "         Additional Information About Your Visit        MyChart Information     [employer] lets you send messages to your doctor, view your test results, renew your prescriptions, schedule appointments and more. To sign up, go to [email or web address] . Click on \"Log in\" on the left side of the screen, which will take you to the Welcome page. Then click on \"Sign up Now\" on the right side of the page.     You will be asked to enter the access code listed below, as well as some personal information. Please follow the directions to create your username and password.     Your access code is: [email or web address]  Expires: 2018 10:00 PM     Your access code will  in 90 days. If you need help or a new code, please call your [facility] clinic or [phone].        Care EveryWhere ID     This is your Care EveryWhere ID. This could be used by other organizations to access your [facility] medical records  [id number]         Blood Pressure from Last 3 Encounters:   No data found for BP    Weight from Last 3 Encounters:   No data found for Wt              We Performed the Following     HC PT EVAL, LOW COMPLEXITY     LEONARDO INITIAL EVAL REPORT     THERAPEUTIC EXERCISES        Primary Care Provider Office Phone # Fax #    Adina Valverde [phone]       [address] [address]        Equal Access to Services     JOSE CARLOS MCGRAW : Hadii aad ku hadasho Soomaali, waaxda luqadaha, qaybta kaalmada adeegyada, coty denton . So [facility] [phone].    ATENCIÓN: Si habla español, tiene a najera disposición servicios gratuitos de asistencia lingüística. Margaret al [phone].    We comply with applicable federal civil rights laws and [place] laws. We do not discriminate on the basis of race, color, national origin, age, disability, sex, sexual orientation, or gender identity.            Thank you!     Thank you for choosing [facility]  for your care. Our goal is always to provide you with excellent care. Hearing back from our patients is one way we can continue to improve our services. Please take a few minutes to complete the written survey that you may receive in the mail after your visit with us. Thank you!             Your Updated Medication List - Protect others around you: Learn how to safely use, store and throw away your medicines at www.disposemymeds.org.      Notice  As of 5/15/2018  3:55 PM    You have not been prescribed any medications.

## 2018-05-15 NOTE — LETTER
"[facility]  [address]  [place] [address] [phone]    May 15, 2018    Re: Jyoti Alvarez   :   1950  MRN:  [id number]   REFERRING PHYSICIAN:   Adina Valverde    [facility]    Date of Initial Evaluation: 05/15/2018  Visits:  Rxs Used: 1  Reason for Referral:     Chronic left-sided low back pain with left-sided sciatica  Hip pain, left    EVALUATION SUMMARY    [facility]  Subjective:  Patient is a 68 year old female presenting with rehab left hip hpi. The history is provided by the patient. No  was used.   Jyoti Alvarez is a 68 year old female with a left hip condition.  Condition occurred with:  Insidious onset.  Condition occurred: for unknown reasons.  This is a chronic condition  Mary saw Dr. Valverde on 18 with c/o L hip pain.  She was diagnosed with trochanteric bursitis and was referred to PT for evaluation and treatment.  Mary reports that her L hip has been bothersome for about 3 years and was going on when she had B foot mid-foot fusions, but now feet are healed and she feels she still \"walks like a duck\" and pain continues in L hip.  Chief complaints: difficulty with stepping up (stairs, up steep hills) leg feels like it's not working right, pain with car transfer.  Pt reports intermittent low back pain, kayli with prolonged walking when waking up in morning.  .    Patient reports pain:  Lateral and greater trochanter (Left low back).  Radiates to:  Gluteals, hip and thigh.  Pain is described as sharp and aching and is intermittent and reported as 2/10.  Associated symptoms:  Buckling/giving out, loss of strength and tingling (tingling in L foot with sitting). Worse during: activity/position dependent.  Symptoms are exacerbated by ascending stairs, bending/squatting, descending stairs, walking, sitting, " "certain positions, lying on extremity, activity and transfers and relieved by rest, ice and NSAID's. Since onset symptoms are unchanged. Special tests:  X-ray (no bony pathology). General health as reported by patient is good. Pertinent medical history includes: Osteoarthritis and depression. Medical allergies: yes (penicillins, sulfa).  Other surgeries include: Orthopedic surgery (back, knee, B feet, L5/S1 fusion). Current medications:  Pain medication, muscle relaxants and anti-depressants.  Current occupation is retired. Primary job tasks include: Repetitive tasks and driving (homemaking tasks, lifting/carrying).  Barriers include:  None as reported by the patient.  Red flags:  None as reported by the patient.  Objective:  Pelvic:  Iliac crest high L   Gait Type:  Antalgic   Assistive Devices:  None  Deviations:  Lumbar:  Trunk lean L  Re: Jyoti Alvarez   :   1950        Hip Evaluation  HIP AROM:  AROM:   Left Hip:     Normal (min loss hip ext, all other motions normal)    Flexion:   Left: 4+/5   Pain:    Extension:  Left:  2+/5  -  Pain:weak/pain free  Abduction:  Left:  3/5    -   Pain:weak/pain free  Adduction:  Left: /5   Pain:weak/pain free  Hip Palpation:  Palpations normal left hip: no TTP in lumbar spine.   Left hip tenderness present at:   Greater Trachanter  Left hip tenderness not present at:  PSIS or Iliac Crest  Functional Testing:  not assessed  Gage Lumbar Evaluation  Sitting: poor  Standing: poor  Lordosis: Reduced  Lateral Shift: yes and left  Correction of Posture: no effect  Other Observations: pt reports \"easier walking\" following repeated extension in lying  Flexion (Flex): min and pain  Extension (EXT): major  Side Glide R (SG R): min and pain  Side Colt L (SG L): min and pain  EIL: During: centralizing  After: no effect  Mechanical Response: no effect  Repeat EIL: During: decreases and centralizing  After: centralizing and better  Mechanical Response: IncROM  Conclusion: " derangement (probably lumbar derangment likely in addition to trochanteric bursitis)    Assessment/Plan:    Patient is a 68 year old female with lumbar and left side hip complaints.    Patient has the following significant findings with corresponding treatment plan.                Diagnosis 1:  L-sided LBP, L hip pain  Pain -  hot/cold therapy, manual therapy, self management, education, directional preference exercise and home program  Decreased ROM/flexibility - manual therapy, therapeutic exercise and home program  Decreased strength - therapeutic exercise, therapeutic activities and home program  Impaired gait - gait training and home program  Impaired muscle performance - neuro re-education and home program  Decreased function - therapeutic activities and home program  Impaired posture - neuro re-education and home program    Therapy Evaluation Codes:   1) History comprised of:   Personal factors that impact the plan of care:      None.    Comorbidity factors that impact the plan of care are:      None.     Medications impacting care: None.      Re: Jyoti Alvarez   :   1950      2) Examination of Body Systems comprised of:   Body structures and functions that impact the plan of care:      Hip and Lumbar spine.   Activity limitations that impact the plan of care are:      Sitting, Squatting/kneeling, Stairs, Walking, Laying down and car transfers, standing after sitting.  3) Clinical presentation characteristics are:   Stable/Uncomplicated.  4) Decision-Making    Low complexity using standardized patient assessment instrument and/or measureable assessment of functional outcome.  Cumulative Therapy Evaluation is: Low complexity.  Previous and current functional limitations:  (See Goal Flow Sheet for this information)    Short term and Long term goals: (See Goal Flow Sheet for this information)   Communication ability:  Patient appears to be able to clearly communicate and understand verbal and written  communication and follow directions correctly.  Treatment Explanation - The following has been discussed with the patient:   RX ordered/plan of care  Anticipated outcomes  Possible risks and side effects  This patient would benefit from PT intervention to resume normal activities.   Rehab potential is excellent.  Frequency:  1 X week, once daily  Duration:  for 8 weeks  Discharge Plan:  Achieve all LTG.  Independent in home treatment program.  Reach maximal therapeutic benefit.    Thank you for your referral.      INQUIRIES  Therapist: Aby George DPT  [facility]  [address] [place] [address]. [address]  [facility] [address]  Phone: [phone]  Fax: [phone]

## 2018-05-25 ENCOUNTER — THERAPY VISIT (OUTPATIENT)
Dept: PHYSICAL THERAPY | Facility: CLINIC | Age: 68
End: 2018-05-25
Payer: COMMERCIAL

## 2018-05-25 DIAGNOSIS — M54.42 CHRONIC LEFT-SIDED LOW BACK PAIN WITH LEFT-SIDED SCIATICA: ICD-10-CM

## 2018-05-25 DIAGNOSIS — M25.552 HIP PAIN, LEFT: ICD-10-CM

## 2018-05-25 DIAGNOSIS — G89.29 CHRONIC LEFT-SIDED LOW BACK PAIN WITH LEFT-SIDED SCIATICA: ICD-10-CM

## 2018-05-25 PROCEDURE — 97112 NEUROMUSCULAR REEDUCATION: CPT | Mod: GP | Performed by: PHYSICAL THERAPIST

## 2018-05-25 PROCEDURE — 97110 THERAPEUTIC EXERCISES: CPT | Mod: GP | Performed by: PHYSICAL THERAPIST

## 2018-05-25 PROCEDURE — 97140 MANUAL THERAPY 1/> REGIONS: CPT | Mod: GP | Performed by: PHYSICAL THERAPIST

## 2018-05-29 ENCOUNTER — THERAPY VISIT (OUTPATIENT)
Dept: PHYSICAL THERAPY | Facility: CLINIC | Age: 68
End: 2018-05-29
Payer: COMMERCIAL

## 2018-05-29 DIAGNOSIS — G89.29 CHRONIC LEFT-SIDED LOW BACK PAIN WITH LEFT-SIDED SCIATICA: ICD-10-CM

## 2018-05-29 DIAGNOSIS — M25.552 HIP PAIN, LEFT: ICD-10-CM

## 2018-05-29 DIAGNOSIS — M54.42 CHRONIC LEFT-SIDED LOW BACK PAIN WITH LEFT-SIDED SCIATICA: ICD-10-CM

## 2018-05-29 PROCEDURE — 97110 THERAPEUTIC EXERCISES: CPT | Mod: GP | Performed by: PHYSICAL THERAPIST

## 2018-05-29 PROCEDURE — 97140 MANUAL THERAPY 1/> REGIONS: CPT | Mod: GP | Performed by: PHYSICAL THERAPIST

## 2018-05-29 PROCEDURE — 97530 THERAPEUTIC ACTIVITIES: CPT | Mod: GP | Performed by: PHYSICAL THERAPIST

## 2018-06-05 ENCOUNTER — THERAPY VISIT (OUTPATIENT)
Dept: PHYSICAL THERAPY | Facility: CLINIC | Age: 68
End: 2018-06-05
Payer: COMMERCIAL

## 2018-06-05 DIAGNOSIS — M54.12 CERVICAL RADICULOPATHY: ICD-10-CM

## 2018-06-05 PROCEDURE — 97110 THERAPEUTIC EXERCISES: CPT | Mod: GP | Performed by: PHYSICAL THERAPIST

## 2018-06-05 PROCEDURE — 97140 MANUAL THERAPY 1/> REGIONS: CPT | Mod: GP | Performed by: PHYSICAL THERAPIST

## 2018-06-26 ENCOUNTER — THERAPY VISIT (OUTPATIENT)
Dept: PHYSICAL THERAPY | Facility: CLINIC | Age: 68
End: 2018-06-26
Payer: COMMERCIAL

## 2018-06-26 DIAGNOSIS — G89.29 CHRONIC LEFT-SIDED LOW BACK PAIN WITH LEFT-SIDED SCIATICA: ICD-10-CM

## 2018-06-26 DIAGNOSIS — M54.42 CHRONIC LEFT-SIDED LOW BACK PAIN WITH LEFT-SIDED SCIATICA: ICD-10-CM

## 2018-06-26 DIAGNOSIS — M25.552 HIP PAIN, LEFT: ICD-10-CM

## 2018-06-26 PROCEDURE — 97110 THERAPEUTIC EXERCISES: CPT | Mod: GP | Performed by: PHYSICAL THERAPIST

## 2018-06-26 PROCEDURE — 97530 THERAPEUTIC ACTIVITIES: CPT | Mod: GP | Performed by: PHYSICAL THERAPIST

## 2018-06-26 PROCEDURE — 97112 NEUROMUSCULAR REEDUCATION: CPT | Mod: GP | Performed by: PHYSICAL THERAPIST

## 2018-07-10 ENCOUNTER — THERAPY VISIT (OUTPATIENT)
Dept: PHYSICAL THERAPY | Facility: CLINIC | Age: 68
End: 2018-07-10
Payer: COMMERCIAL

## 2018-07-10 DIAGNOSIS — M54.12 CERVICAL RADICULOPATHY: ICD-10-CM

## 2018-07-10 PROCEDURE — 97110 THERAPEUTIC EXERCISES: CPT | Mod: GP | Performed by: PHYSICAL THERAPIST

## 2018-07-10 PROCEDURE — 97140 MANUAL THERAPY 1/> REGIONS: CPT | Mod: GP | Performed by: PHYSICAL THERAPIST

## 2018-07-25 ENCOUNTER — THERAPY VISIT (OUTPATIENT)
Dept: PHYSICAL THERAPY | Facility: CLINIC | Age: 68
End: 2018-07-25
Payer: COMMERCIAL

## 2018-07-25 DIAGNOSIS — M25.552 HIP PAIN, LEFT: ICD-10-CM

## 2018-07-25 DIAGNOSIS — G89.29 CHRONIC LEFT-SIDED LOW BACK PAIN WITH LEFT-SIDED SCIATICA: ICD-10-CM

## 2018-07-25 DIAGNOSIS — M54.42 CHRONIC LEFT-SIDED LOW BACK PAIN WITH LEFT-SIDED SCIATICA: ICD-10-CM

## 2018-07-25 PROBLEM — M54.12 CERVICAL RADICULOPATHY: Status: RESOLVED | Noted: 2018-05-01 | Resolved: 2018-07-25

## 2018-07-25 PROCEDURE — 97530 THERAPEUTIC ACTIVITIES: CPT | Mod: GP | Performed by: PHYSICAL THERAPIST

## 2018-07-25 PROCEDURE — 97110 THERAPEUTIC EXERCISES: CPT | Mod: GP | Performed by: PHYSICAL THERAPIST

## 2018-07-25 ASSESSMENT — ACTIVITIES OF DAILY LIVING (ADL)
GOING_UP_1_FLIGHT_OF_STAIRS: SLIGHT DIFFICULTY
STANDING_FOR_15_MINUTES: NO DIFFICULTY AT ALL
GETTING_INTO_AND_OUT_OF_AN_AVERAGE_CAR: NO DIFFICULTY AT ALL
HOS_ADL_HIGHEST_POTENTIAL_SCORE: 68
HEAVY_WORK: MODERATE DIFFICULTY
SITTING_FOR_15_MINUTES: NO DIFFICULTY AT ALL
HOS_ADL_ITEM_SCORE_TOTAL: 56
HOS_ADL_COUNT: 17
WALKING_15_MINUTES_OR_GREATER: NO DIFFICULTY AT ALL
HOS_ADL_SCORE(%): 82.35
WALKING_UP_STEEP_HILLS: SLIGHT DIFFICULTY
GOING_DOWN_1_FLIGHT_OF_STAIRS: SLIGHT DIFFICULTY
DEEP_SQUATTING: SLIGHT DIFFICULTY
PUTTING_ON_SOCKS_AND_SHOES: NO DIFFICULTY AT ALL
GETTING_INTO_AND_OUT_OF_A_BATHTUB: SLIGHT DIFFICULTY
RECREATIONAL_ACTIVITIES: SLIGHT DIFFICULTY
TWISTING/PIVOTING_ON_INVOLVED_LEG: SLIGHT DIFFICULTY
WALKING_APPROXIMATELY_10_MINUTES: NO DIFFICULTY AT ALL
WALKING_INITIALLY: NO DIFFICULTY AT ALL
LIGHT_TO_MODERATE_WORK: SLIGHT DIFFICULTY
STEPPING_UP_AND_DOWN_CURBS: SLIGHT DIFFICULTY
HOW_WOULD_YOU_RATE_YOUR_CURRENT_LEVEL_OF_FUNCTION_DURING_YOUR_USUAL_ACTIVITIES_OF_DAILY_LIVING_FROM_0_TO_100_WITH_100_BEING_YOUR_LEVEL_OF_FUNCTION_PRIOR_TO_YOUR_HIP_PROBLEM_AND_0_BEING_THE_INABILITY_TO_PERFORM_ANY_OF_YOUR_USUAL_DAILY_ACTIVITIES?: 80
WALKING_DOWN_STEEP_HILLS: SLIGHT DIFFICULTY
ROLLING_OVER_IN_BED: NO DIFFICULTY AT ALL

## 2018-07-25 NOTE — PROGRESS NOTES
"Subjective:  HPI  Oswestry Score: 6 %                 Objective:  System    Physical Exam    General     ROS    Assessment/Plan:    DISCHARGE REPORT    Progress reporting period is from 5/1/18 to 7/25/18.       SUBJECTIVE  Overall, Mary reports that back and hip aer doing well.  Ran a slow mile at the club this morning and without any pain.  Occasional pain with exercise at [employer] or on days where she has a lot of stair activity (laundry in basement and wakls up 2 flights).  FEels that she is ready to progress on her own after today.  Neck pain is signficantly improved and she now only feels stretch with neck motions.    Current Pain level: 0/10.     Initial Pain level: 4/10.   Changes in function:  Yes (See Goal flowsheet attached for changes in current functional level)  Adverse reaction to treatment or activity: Some \"sciatic pain\" with leg lifts at [employer].     OBJECTIVE  Changes noted in objective findings:  Yes, please see below.   Objective:  Lumbar AROM: flexion full with B HS stretch, ext mod loss + stretch, B SG min loss + mild pain (does not last).  Cervical AROM: flexion full and painfree, ext min loss, B SB min loss, B rotation min loss; all painfree with stretch at end-range.  Mary has a HEP that is complete with spinal mobility exercises, postural adjustments, core and hip/LE strengthening.  She is participating in a variety of classes at the club- yoga, pilates, strengthening and continues to attain 10,000 steps/day, has also started some slow jogging.  At this time, Mary is ready to progress on her own and has been instructed to return to PT should she experience an exacerbation of symptoms that she cannot manage independently.      ASSESSMENT/PLAN  Updated problem list and treatment plan: Diagnosis 1:  Neck pain, L arm pain, L hip pain, LBP  Pain -  home program  Decreased ROM/flexibility - home program  Decreased strength - home program  STG/LTGs have been met or progress has been made towards " goals:  Yes (See Goal flow sheet completed today.)  Assessment of Progress: The patient has met all of their long term goals.  Self Management Plans:  Patient has been instructed in a home treatment program.  Patient  has been instructed in self management of symptoms.  I have re-evaluated this patient and find that the nature, scope, duration and intensity of the therapy is appropriate for the medical condition of the patient.  Jyoti continues to require the following intervention to meet STG and LTG's:  PT intervention is no longer required to meet STG/LTG.    Recommendations:  This patient is ready to be discharged from therapy and continue their home treatment program.    Please refer to the daily flowsheet for treatment today, total treatment time and time spent performing 1:1 timed codes.

## 2018-07-25 NOTE — LETTER
"[facility]  [address]  [place] [address] [phone]    2018    Re: Jyoti Alvarez   :   1950  MRN:  [id number]   REFERRING PHYSICIAN:   Adina Valverde    [facility]    Date of Initial Evaluation: 05/15/2018  Visits:  Rxs Used: 6  Reason for Referral:     Chronic left-sided low back pain with left-sided sciatica  Hip pain, left    DISCHARGE REPORT    Progress reporting period is from 18 to 18.       SUBJECTIVE  Overall, Mary reports that back and hip aer doing well.  Ran a slow mile at the club this morning and without any pain.  Occasional pain with exercise at [employer] or on days where she has a lot of stair activity (laundry in basement and wakls up 2 flights).  FEels that she is ready to progress on her own after today.  Neck pain is signficantly improved and she now only feels stretch with neck motions.    Current Pain level: 0/10.     Initial Pain level: 4/10.   Changes in function:  Yes (See Goal flowsheet attached for changes in current functional level)  Adverse reaction to treatment or activity: Some \"[facility]\" [facility]     OBJECTIVE  Changes noted in objective findings:  Yes, please see below.   Objective:  Lumbar AROM: flexion full with B HS stretch, ext mod loss + stretch, B SG min loss + mild pain (does not last).  Cervical AROM: flexion full and painfree, ext min loss, B SB min loss, B rotation min loss; all painfree with stretch at end-range.  Mary has a HEP that is complete with spinal mobility exercises, postural adjustments, core and hip/LE strengthening.  She is participating in a variety of classes at the club- yoga, pilates, strengthening and continues to attain 10,000 steps/day, has also started some slow jogging.  At this time, Mary is ready to progress on her own and has been instructed to return to PT should she " experience an exacerbation of symptoms that she cannot manage independently.      ASSESSMENT/PLAN  Updated problem list and treatment plan: Diagnosis 1:  Neck pain, L arm pain, L hip pain, LBP  Pain -  home program  Decreased ROM/flexibility - home program  Decreased strength - home program  STG/LTGs have been met or progress has been made towards goals:  Yes (See Goal flow sheet completed today.)  Re: Jyoti Alvarez   :   1950        Assessment of Progress: The patient has met all of their long term goals.  Self Management Plans:  Patient has been instructed in a home treatment program.  Patient  has been instructed in self management of symptoms.  I have re-evaluated this patient and find that the nature, scope, duration and intensity of the therapy is appropriate for the medical condition of the patient.  Jyoti continues to require the following intervention to meet STG and LTG's:  PT intervention is no longer required to meet STG/LTG.    Recommendations:  This patient is ready to be discharged from therapy and continue their home treatment program.    Thank you for your referral.        INQUIRIES  Therapist: Aby George DPT  [facility]  [address] [place] [address]. [address]  [facility] [address]  Phone: [phone]  Fax: [phone]

## 2018-07-25 NOTE — MR AVS SNAPSHOT
After Visit Summary   7/25/2018    Jyoti Alvarez    MRN: [id number]           Patient Information     Date Of Birth          1950        Visit Information        Provider Department      7/25/2018 2:00 PM Aby George, PT [facility]        Today's Diagnoses     Chronic left-sided low back pain with left-sided sciatica        Hip pain, left           Follow-ups after your visit        Who to contact     If you have questions or need follow up information about today's clinic visit or your schedule please contact [facility] directly at [phone].  Normal or non-critical lab and imaging results will be communicated to you by MyChart, letter or phone within 4 business days after the clinic has received the results. If you do not hear from us within 7 days, please contact the clinic through MyChart or phone. If you have a critical or abnormal lab result, we will notify you by phone as soon as possible.  Submit refill requests through [employer] or call your pharmacy and they will forward the refill request to us. Please allow 3 business days for your refill to be completed.          Additional Information About Your Visit        Care EveryWhere ID     This is your Care EveryWhere ID. This could be used by other organizations to access your [facility] medical records  [id number]         Blood Pressure from Last 3 Encounters:   No data found for BP    Weight from Last 3 Encounters:   No data found for Wt              We Performed the Following     LEONARDO PROGRESS NOTES REPORT     THERAPEUTIC ACTIVITIES     THERAPEUTIC EXERCISES        Primary Care Provider Office Phone # Fax #    Adina Valverde [phone]       [place] [address] [facility] [address]        Equal Access to Services     JOSE CARLOS MCGRAW : martha Haq qaybta  coty mariaangelica denton ah. [facility] [phone].    ATENCIÓN: Si habla helen, tiene a najera disposición servicios gratuitos de asistencia lingüística. Llame al [phone].    We comply with applicable federal civil rights laws and [place] laws. We do not discriminate on the basis of race, color, national origin, age, disability, sex, sexual orientation, or gender identity.            Thank you!     Thank you for choosing [facility]  for your care. Our goal is always to provide you with excellent care. Hearing back from our patients is one way we can continue to improve our services. Please take a few minutes to complete the written survey that you may receive in the mail after your visit with us. Thank you!             Your Updated Medication List - Protect others around you: Learn how to safely use, store and throw away your medicines at www.disposemymeds.org.      Notice  As of 7/25/2018  5:00 PM    You have not been prescribed any medications.